# Patient Record
Sex: FEMALE | Race: WHITE | Employment: OTHER | URBAN - METROPOLITAN AREA
[De-identification: names, ages, dates, MRNs, and addresses within clinical notes are randomized per-mention and may not be internally consistent; named-entity substitution may affect disease eponyms.]

---

## 2018-02-09 ENCOUNTER — HOSPITAL ENCOUNTER (OUTPATIENT)
Dept: PHYSICAL THERAPY | Age: 68
Discharge: HOME | End: 2018-02-09
Payer: COMMERCIAL

## 2018-02-09 PROCEDURE — 97110 THERAPEUTIC EXERCISES: CPT | Performed by: PHYSICAL THERAPIST

## 2018-02-09 PROCEDURE — 97162 PT EVAL MOD COMPLEX 30 MIN: CPT | Performed by: PHYSICAL THERAPIST

## 2018-02-09 NOTE — PROGRESS NOTES
[facility] at [address] [facility]  [address]  Phone: [phone]      Fax:  [phone]    Plan of Care/ Statement of Necessity for Physical Therapy Services  Patient name: Brennan Villa Start of Care: 2018   Referral source: [facility], Not On File, MD : 1950    Medical Diagnosis: Displaced trimalleolar fracture of right lower leg, initial encounter for closed fracture [S82.851A]   Onset Date:18    Treatment Diagnosis: Pain and decreased ROM of the right ankle. Prior Hospitalization: see medical history Provider#: [id number]   Medications: Verified on Patient summary List    Comorbidities: None   Prior Level of Function: Independent, no limitations. She was the  at [employer], worked 50-55 hours per week     The Plan of Care and following information is based on the information from the initial evaluation. Assessment/ key information: Patient with signs and symptoms consistent with fracture and ORIF of the right ankle. Patient will benefit from a program of skilled physical therapy to include therapeutic exercises to address strength deficits, therapeutic activities to improve functional mobility, neuromuscular reeducation to address balance, coordination and proprioception, manual therapy to address ROM and tissue extensibility and modalities as indicated. All questions were answered. Evaluation Complexity History LOW Complexity : Zero comorbidities / personal factors that will impact the outcome / POC; Examination MEDIUM Complexity : 3 Standardized tests and measures addressing body structure, function, activity limitation and / or participation in recreation  ;Presentation MEDIUM Complexity : Evolving with changing characteristics  ; Clinical Decision Making MEDIUM Complexity : FOTO score of 26-74  Overall Complexity Rating: MEDIUM  Problem List: decrease ROM, decrease strength, edema affecting function, impaired gait/ balance, decrease ADL/ functional abilitiies, decrease activity tolerance, decrease flexibility/ joint mobility and decrease transfer abilities   Treatment Plan may include any combination of the following: Therapeutic exercise, Therapeutic activities, Neuromuscular re-education, Physical agent/modality and Manual therapy  Patient / Family readiness to learn indicated by: asking questions  Persons(s) to be included in education: patient (P)  Barriers to Learning/Limitations: None  Patient Goal (s): Get motion back in the ankle.   Patient Self Reported Health Status: good  Rehabilitation Potential: good    Short Term Goals: To be accomplished in 2 weeks:  1. Patient will be independent and competent in her HEP in order to participate in her rehabilitation for return to function. Long Term Goals: To be accomplished in 8 weeks:  1. Patient's pain level will be 0-1/10 with activity in order to improve patient's ability to perform normal ADLs. 2. Patient will demonstrate PROM 0-5 degrees dorsiflexion; 0-55 degrees plantar flexion; 0-20 degrees inversion; 0-10 degrees eversion to increase ease of ADLs. 3. Patient will increase FOTO > 16 pts to increase functional mobility. 4. Patient will be FWB and ambulate without any assistive device and be able to ascend and descend steps to increase ease of entry into home      Frequency / Duration: Patient to be seen 2-3 times per week for 8 weeks. Patient/ Caregiver education and instruction: Diagnosis, prognosis, exercises   [x]  Plan of care has been reviewed with JABIER Andrade, PT 2/9/2018 11:31 AM  _____________________________________________________________________  I certify that the above Therapy Services are being furnished while the patient is under my care. I agree with the treatment plan and certify that this therapy is necessary.     Physician's Signature:____________________  Date:__________Time:______    Please sign and return to [facility] at [facility]  [address] [facility] [facility], [address] [place] [address]  Phone: [phone]      Fax:  [phone]

## 2018-02-09 NOTE — PROGRESS NOTES
PT DAILY TREATMENT NOTE     Patient Name: Mango Elizabeth  Date:2018  : 1950  [x]  Patient  Verified  Payor: Riya Shafer / Plan: [address] [place] [address] / Product Type: PPO /    In time:9:40  Out time:10:20  Total Treatment Time (min): 40  Visit #: 1 of 24    Treatment Area: Displaced trimalleolar fracture of right lower leg, initial encounter for closed fracture [S82.283K]    SUBJECTIVE  Pain Level (0-10 scale): 1/10  Any medication changes, allergies to medications, adverse drug reactions, diagnosis change, or new procedure performed?: [x] No    [] Yes (see summary sheet for update)  Subjective functional status/changes:   [] No changes reported  See Evaluation. OBJECTIVE    20 min [x]Eval                  []Re-Eval       20 min Therapeutic Exercise:  [x] See flow sheet :   Rationale: increase ROM and increase strength to improve the patients ability to increase her functional activity level. With   [] TE   [] TA   [] neuro   [] other: Patient Education: [x] Review HEP    [] Progressed/Changed HEP based on:   [] positioning   [] body mechanics   [] transfers   [] heat/ice application    [] other:      Other Objective/Functional Measures:   See Evaluation. Pain Level (0-10 scale) post treatment: 1/10    ASSESSMENT/Changes in Function:   See Evaluation. Patient will continue to benefit from skilled PT services to modify and progress therapeutic interventions, address functional mobility deficits, address ROM deficits, address strength deficits, analyze and address soft tissue restrictions, analyze and cue movement patterns, analyze and modify body mechanics/ergonomics and assess and modify postural abnormalities to attain remaining goals. [x]  See Plan of Care  []  See progress note/recertification  []  See Discharge Summary         Progress towards goals / Updated goals:  1.  Patient's pain level will be 0-1/10 with activity in order to improve patient's ability to perform normal ADLs. Eval:  1-3/10  Current:  2. Patient will demonstrate PROM 0-5 degrees dorsiflexion; 0-55 degrees plantar flexion; 0-20 degrees inversion; 0-10 degrees eversion to increase ease of ADLs. Eval:  DF: 50-5 degrees; PF: 5-50 degrees; Inv: 0-5 degrees; Ev 5-0 degrees. Current:  3. Patient will increase FOTO > 16 pts to increase functional mobility. Eval:  48  Current:  4. Patient will be FWB and ambulate without any assistive device and be able to ascend and descend steps to increase ease of entry into home. Eval:  Patient is NWB, using a rolling walker, she is unable to do steps except on her bottom.   Current:    PLAN  [x]  Upgrade activities as tolerated     [x]  Continue plan of care  []  Update interventions per flow sheet       []  Discharge due to:_  []  Other:_      Batsheva Bustamante, PT 2/9/2018  11:42 AM    Future Appointments  Date Time Provider Naa Freitas   2/12/2018 9:30 AM [facility] [place] [facility]   2/15/2018 9:00 AM [facility] PT [facility]   2/19/2018 9:00 AM [facility] PT [place] [facility]   2/22/2018 9:30 AM [facility] PT [place] [facility]   2/26/2018 10:00 AM [facility] PT [place] [facility]   3/1/2018 9:00 AM [facility] PT [facility]

## 2018-02-12 ENCOUNTER — HOSPITAL ENCOUNTER (OUTPATIENT)
Dept: PHYSICAL THERAPY | Age: 68
Discharge: HOME | End: 2018-02-12
Payer: COMMERCIAL

## 2018-02-12 PROCEDURE — 97140 MANUAL THERAPY 1/> REGIONS: CPT | Performed by: PHYSICAL THERAPIST

## 2018-02-12 PROCEDURE — 97110 THERAPEUTIC EXERCISES: CPT | Performed by: PHYSICAL THERAPIST

## 2018-02-12 NOTE — PROGRESS NOTES
PT DAILY TREATMENT NOTE     Patient Name: Nicola Madison  Date:2018  : 1950  [x]  Patient  Verified  Payor: Mai Gross / Plan: [address] [place] [address] / Product Type: PPO /    In time:9:25  Out time:10:20  Total Treatment Time (min): 54  Visit #: 2 of 24    Treatment Area: Displaced trimalleolar fracture of right lower leg, initial encounter for closed fracture [S80.094P]    SUBJECTIVE  Pain Level (0-10 scale): 0  Any medication changes, allergies to medications, adverse drug reactions, diagnosis change, or new procedure performed?: [x] No    [] Yes (see summary sheet for update)  Subjective functional status/changes:   [] No changes reported  Patient reports that she has been doing her HEP and feels she has improved mobility. She does note she has no pain at this time. She remains on restricted WB status. OBJECTIVE    45 min Therapeutic Exercise:  [x] See flow sheet :   Rationale: increase ROM and increase strength to improve the patients ability to return to her normal level of activity. 10 min Manual Therapy:  Grade 2-3 joint mobs to talocrural and subtalar joints and midfoot and forefoot. Rationale: increase ROM and increase tissue extensibility to improve comfort and ability to return to normal function. With   [] TE   [] TA   [] neuro   [] other: Patient Education: [x] Review HEP    [] Progressed/Changed HEP based on:   [] positioning   [] body mechanics   [] transfers   [] heat/ice application    [] other:      Other Objective/Functional Measures:   Mild forefoot swelling is evident. Steristrips remain intact. No open areas or drainage noted. The ankle is not warm to the touch and no redness noted. She remains NWB with her rolling walker. Pain Level (0-10 scale) post treatment: 0/10    ASSESSMENT/Changes in Function:   Patient has improved mobility of the right ankle with no significant pain.   Patient will continue to benefit from skilled PT services to modify and progress therapeutic interventions, address functional mobility deficits, address ROM deficits, address strength deficits, analyze and address soft tissue restrictions, analyze and cue movement patterns and analyze and modify body mechanics/ergonomics to attain remaining goals. [x]  See Plan of Care  []  See progress note/recertification  []  See Discharge Summary         Progress towards goals / Updated goals:  1. Patient's pain level will be 0-1/10 with activity in order to improve patient's ability to perform normal ADLs. Eval:  1-3/10  Current:  2. Patient will demonstrate PROM 0-5 degrees dorsiflexion; 0-55 degrees plantar flexion; 0-20 degrees inversion; 0-10 degrees eversion to increase ease of ADLs. Eval:  DF: 50-5 degrees; PF: 5-50 degrees; Inv: 0-5 degrees; Ev 5-0 degrees. Current:  3. Patient will increase FOTO > 16 pts to increase functional mobility. Eval:  48  Current:  4. Patient will be FWB and ambulate without any assistive device and be able to ascend and descend steps to increase ease of entry into home. Eval:  Patient is NWB, using a rolling walker, she is unable to do steps except on her bottom.   Current:    PLAN  [x]  Upgrade activities as tolerated     [x]  Continue plan of care  []  Update interventions per flow sheet       []  Discharge due to:_  []  Other:_      Candace Pinedo, PT 2/12/2018  10:29 AM    Future Appointments  Date Time Provider Naa Freitas   2/15/2018 9:00 AM [facility] PT [place] [id number] [facility]   2/19/2018 9:00 AM [facility] PT [place] [facility]   2/22/2018 9:30 AM [facility] PT [place] [id number] [facility]   2/26/2018 10:00 AM [facility] PT [place] [id number] [facility]   3/1/2018 9:00 AM [facility]

## 2018-02-15 ENCOUNTER — APPOINTMENT (OUTPATIENT)
Dept: PHYSICAL THERAPY | Age: 68
End: 2018-02-15
Payer: COMMERCIAL

## 2018-02-16 ENCOUNTER — HOSPITAL ENCOUNTER (OUTPATIENT)
Dept: PHYSICAL THERAPY | Age: 68
Discharge: HOME | End: 2018-02-16
Payer: COMMERCIAL

## 2018-02-16 PROCEDURE — 97140 MANUAL THERAPY 1/> REGIONS: CPT | Performed by: PHYSICAL THERAPIST

## 2018-02-16 PROCEDURE — 97112 NEUROMUSCULAR REEDUCATION: CPT | Performed by: PHYSICAL THERAPIST

## 2018-02-16 PROCEDURE — 97110 THERAPEUTIC EXERCISES: CPT | Performed by: PHYSICAL THERAPIST

## 2018-02-16 NOTE — PROGRESS NOTES
PT DAILY TREATMENT NOTE     Patient Name: Britt Melgar  Date:2018  : 1950  [x]  Patient  Verified  Payor: Mayo Ring / Plan: [address] [place] [address] / Product Type: PPO /    In time:10:30  Out time:11:45  Total Treatment Time (min): 60  Visit #: 3 of 24    Treatment Area: Displaced trimalleolar fracture of right lower leg, initial encounter for closed fracture [L82.028F]    SUBJECTIVE  Pain Level (0-10 scale): 0  Any medication changes, allergies to medications, adverse drug reactions, diagnosis change, or new procedure performed?: [x] No    [] Yes (see summary sheet for update)  Subjective functional status/changes:   [] No changes reported  Patient notes she has been having no significant pain. Notes some tightness around the heel. She states her incisions have been healing great. She states she has been elevating throughout the day. OBJECTIVE    30 min Therapeutic Exercise:  [] See flow sheet :   Rationale: increase ROM and increase strength to improve the patients ability to increase her activity level. 15 min Neuromuscular Re-education:  []  See flow sheet :   Rationale: increase strength, improve coordination and increase proprioception  to improve the patients ability to increase her ADLs. 15 min Manual Therapy:  Grade 2/3 talocrural and subtalar joint mobs, heel cord stretching   Rationale: increase ROM and increase tissue extensibility to improve ease of movement to increase functional activity. With   [] TE   [] TA   [] neuro   [] other: Patient Education: [x] Review HEP    [] Progressed/Changed HEP based on:   [] positioning   [] body mechanics   [] transfers   [] heat/ice application    [] other:      Other Objective/Functional Measures:   Tight heel cord right ankle. Incisions are well healed, no open areas noted, no drainage. Mild swelling in the right forefoot.      Pain Level (0-10 scale) post treatment: 0    ASSESSMENT/Changes in Function: Patient with improved AROM and less swelling in the right foot and ankle. Patient will continue to benefit from skilled PT services to modify and progress therapeutic interventions, address functional mobility deficits, address ROM deficits, address strength deficits and analyze and address soft tissue restrictions to attain remaining goals. [x]  See Plan of Care  []  See progress note/recertification  []  See Discharge Summary         Progress towards goals / Updated goals:  1. Patient's pain level will be 0-1/10 with activity in order to improve patient's ability to perform normal ADLs. Eval:  1-3/10  Current: 0-1/10.  2/16/18  2. Patient will demonstrate PROM 0-5 degrees dorsiflexion; 0-55 degrees plantar flexion; 0-20 degrees inversion; 0-10 degrees eversion to increase ease of ADLs. Eval:  DF: 50-5 degrees; PF: 5-50 degrees; Inv: 0-5 degrees; Ev 5-0 degrees. Current:  3. Patient will increase FOTO > 16 pts to increase functional mobility. Eval:  18  Current:  4. Patient will be FWB and ambulate without any assistive device and be able to ascend and descend steps to increase ease of entry into home. Eval:  Patient is NWB, using a rolling walker, she is unable to do steps except on her bottom.   Current:  Remains NWB    PLAN  [x]  Upgrade activities as tolerated     [x]  Continue plan of care  []  Update interventions per flow sheet       []  Discharge due to:_  []  Other:_      Mikael العراقي, PT 2/16/2018  11:51 AM    Future Appointments  Date Time Provider Naa Freitas   2/19/2018 9:00 AM [facility] PT [place] [facility]   2/22/2018 9:30 AM [facility] PT [place] [facility]   2/26/2018 10:00 AM [facility] PT [place] [facility]   3/1/2018 9:00 AM [facility] PT [place] [facility]

## 2018-02-19 ENCOUNTER — HOSPITAL ENCOUNTER (OUTPATIENT)
Dept: PHYSICAL THERAPY | Age: 68
Discharge: HOME | End: 2018-02-19
Payer: COMMERCIAL

## 2018-02-19 PROCEDURE — 97110 THERAPEUTIC EXERCISES: CPT | Performed by: PHYSICAL THERAPIST

## 2018-02-19 PROCEDURE — 97140 MANUAL THERAPY 1/> REGIONS: CPT | Performed by: PHYSICAL THERAPIST

## 2018-02-19 NOTE — PROGRESS NOTES
PT DAILY TREATMENT NOTE     Patient Name: Josesito Epstein  Date:2018  : 1950  [x]  Patient  Verified  Payor: Ryan Alcocer / Plan: [address] [place] [address] / Product Type: PPO /    In time:9:00  Out time:9:55  Total Treatment Time (min): 55  Visit #: 4 of 24    Treatment Area: Displaced trimalleolar fracture of right lower leg, initial encounter for closed fracture [W43.052O]    SUBJECTIVE  Pain Level (0-10 scale): 0  Any medication changes, allergies to medications, adverse drug reactions, diagnosis change, or new procedure performed?: [x] No    [] Yes (see summary sheet for update)  Subjective functional status/changes:   [] No changes reported  Patient reports that she has intermittent pain in the right foot/ankle. She usually notices the pain at night. Pain level at worst 1/10; at best 0/10. OBJECTIVE  45 min Therapeutic Exercise:  [] See flow sheet :   Rationale: increase ROM and increase strength to improve the patients ability to be able to increase her functional activity level. 10 min Manual Therapy:  Grade 2/3 subtalar and talocrural and midfoot joint mobs   Rationale: increase ROM and increase tissue extensibility to improve ease of motion to regain functional mobility. With   [] TE   [] TA   [] neuro   [] other: Patient Education: [x] Review HEP    [] Progressed/Changed HEP based on:   [] positioning   [] body mechanics   [] transfers   [] heat/ice application    [] other:      Other Objective/Functional Measures:   PROM:  DF: 0-4; PF: 0-50; INV: 0-20; EV: 0-10. She remains NWB in the boot with her rolling walker. Mild swelling noted in the right foot and ankle. Pain Level (0-10 scale) post treatment: 0    ASSESSMENT/Changes in Function:   Patient has increased ROM in the right ankle. She continues with only minimal pain and she remains NWB with her walker.   Patient will continue to benefit from skilled PT services to modify and progress therapeutic interventions, address functional mobility deficits, address ROM deficits, address strength deficits, analyze and address soft tissue restrictions, analyze and cue movement patterns and analyze and modify body mechanics/ergonomics to attain remaining goals. [x]  See Plan of Care  []  See progress note/recertification  []  See Discharge Summary         Progress towards goals / Updated goals:  1. Patient's pain level will be 0-1/10 with activity in order to improve patient's ability to perform normal ADLs. Eval:  1-3/10  Current: 0-1/10.  2/16/18  2. Patient will demonstrate PROM 0-5 degrees dorsiflexion; 0-55 degrees plantar flexion; 0-20 degrees inversion; 0-10 degrees eversion to increase ease of ADLs. Eval:  DF: 50-5 degrees; PF: 5-50 degrees; Inv: 0-5 degrees; Ev 5-0 degrees. Current: DF: 0-4; PF: 0-50; INV: 0-20; EV: 0-10.  2/19/18  3. Patient will increase FOTO > 16 pts to increase functional mobility. Eval:  57  Current:  4. Patient will be FWB and ambulate without any assistive device and be able to ascend and descend steps to increase ease of entry into home. Eval:  Patient is NWB, using a rolling walker, she is unable to do steps except on her bottom.   Current:  Remains NWB    PLAN  [x]  Upgrade activities as tolerated     [x]  Continue plan of care  []  Update interventions per flow sheet       []  Discharge due to:_  []  Other:_      Candace Pinedo, PT 2/19/2018  9:05 AM    Future Appointments  Date Time Provider Naa Freitas   2/22/2018 9:30 AM [facility] PT [address] [facility]   2/26/2018 10:00 AM [facility] PT [place] [facility]   3/1/2018 9:00 AM [facility] PT [facility]

## 2018-02-22 ENCOUNTER — HOSPITAL ENCOUNTER (OUTPATIENT)
Dept: PHYSICAL THERAPY | Age: 68
Discharge: HOME | End: 2018-02-22
Payer: COMMERCIAL

## 2018-02-22 PROCEDURE — 97110 THERAPEUTIC EXERCISES: CPT | Performed by: PHYSICAL THERAPIST

## 2018-02-22 PROCEDURE — 97140 MANUAL THERAPY 1/> REGIONS: CPT | Performed by: PHYSICAL THERAPIST

## 2018-02-22 PROCEDURE — 97530 THERAPEUTIC ACTIVITIES: CPT | Performed by: PHYSICAL THERAPIST

## 2018-02-22 PROCEDURE — 97112 NEUROMUSCULAR REEDUCATION: CPT | Performed by: PHYSICAL THERAPIST

## 2018-02-22 NOTE — PROGRESS NOTES
PT DAILY TREATMENT NOTE     Patient Name: Consuelo Krishna  Date:2018  : 1950  [x]  Patient  Verified  Payor: Trent Floyd / Plan: [address] [place] [address] / Product Type: PPO /    In time:9:30  Out time:11:10  Total Treatment Time (min): 90  Visit #: 5 of 24    Treatment Area: Displaced trimalleolar fracture of right lower leg, initial encounter for closed fracture [B93.203O]    SUBJECTIVE  Pain Level (0-10 scale): 0  Any medication changes, allergies to medications, adverse drug reactions, diagnosis change, or new procedure performed?: [x] No    [] Yes (see summary sheet for update)  Subjective functional status/changes:   [] No changes reported  Patient reports that she is doing much better. She has only occasional twinges of pain in the lateral ankle. She notes greater mobility in the ankle. OBJECTIVE    30 min Therapeutic Exercise:  [] See flow sheet :   Rationale: increase ROM and increase strength to improve the patients ability to perform her normal ADLs. 15 min Therapeutic Activity:  []  See flow sheet :   Rationale: improve coordination and increase proprioception  to improve the patients ability to increase her normal ADLs. 30 min Neuromuscular Re-education:  []  See flow sheet :   Rationale: increase strength, improve coordination and increase proprioception  to improve the patients ability to improve her strength in order to work toward increased function. 15 min Manual Therapy:  Grade 3/4 subtalar and talocrural joint mobs and ankle stretching. Rationale: increase ROM and increase tissue extensibility to improve mobility for increased function. With   [] TE   [] TA   [] neuro   [] other: Patient Education: [x] Review HEP    [] Progressed/Changed HEP based on:   [] positioning   [] body mechanics   [] transfers   [] heat/ice application    [] other:      Other Objective/Functional Measures:   Patient remains NWB on the right with a rolling walker.   She continues using the boot. She has mild swellling in the right foot. Pain Level (0-10 scale) post treatment: 0    ASSESSMENT/Changes in Function:   Patient continues to progress her ROM well. Function remains limited at she is still NWB. Patient will continue to benefit from skilled PT services to modify and progress therapeutic interventions, address functional mobility deficits, address ROM deficits, address strength deficits, analyze and address soft tissue restrictions, analyze and cue movement patterns and analyze and modify body mechanics/ergonomics to attain remaining goals. []  See Plan of Care  []  See progress note/recertification  []  See Discharge Summary         Progress towards goals / Updated goals:  1. Patient's pain level will be 0-1/10 with activity in order to improve patient's ability to perform normal ADLs. Eval:  1-3/10  Current: 0-1/10.  2/16/18  2. Patient will demonstrate PROM 0-5 degrees dorsiflexion; 0-55 degrees plantar flexion; 0-20 degrees inversion; 0-10 degrees eversion to increase ease of ADLs. Eval:  DF: 50-5 degrees; PF: 5-50 degrees; Inv: 0-5 degrees; Ev 5-0 degrees. Current: DF: 0-4; PF: 0-50; INV: 0-20; EV: 0-10.  2/19/18  3. Patient will increase FOTO > 16 pts to increase functional mobility. Eval:  17  Current:  4. Patient will be FWB and ambulate without any assistive device and be able to ascend and descend steps to increase ease of entry into home. Eval:  Patient is NWB, using a rolling walker, she is unable to do steps except on her bottom.   Current:  Remains NWB    PLAN  []  Upgrade activities as tolerated     []  Continue plan of care  []  Update interventions per flow sheet       []  Discharge due to:_  []  Other:_      Fady Decker PT 2/22/2018  12:24 PM    Future Appointments  Date Time Provider Naa Freitas   2/26/2018 10:00 AM [facility] [place] [facility]   3/1/2018 9:00 AM [facility] [place] [facility]   3/5/2018 10:00 AM Tricia Salmon Frankie Lugo [facility]   3/8/2018 9:30 AM Caterina Howe, CHAO [facility]   3/12/2018 9:30 AM Caterina Howe PT [facility]   3/15/2018 9:30 AM [facility] [place]

## 2018-02-26 ENCOUNTER — HOSPITAL ENCOUNTER (OUTPATIENT)
Dept: PHYSICAL THERAPY | Age: 68
Discharge: HOME | End: 2018-02-26
Payer: COMMERCIAL

## 2018-02-26 PROCEDURE — 97140 MANUAL THERAPY 1/> REGIONS: CPT | Performed by: PHYSICAL THERAPIST

## 2018-02-26 PROCEDURE — 97110 THERAPEUTIC EXERCISES: CPT | Performed by: PHYSICAL THERAPIST

## 2018-02-26 PROCEDURE — 97530 THERAPEUTIC ACTIVITIES: CPT | Performed by: PHYSICAL THERAPIST

## 2018-02-26 PROCEDURE — 97112 NEUROMUSCULAR REEDUCATION: CPT | Performed by: PHYSICAL THERAPIST

## 2018-02-26 NOTE — PROGRESS NOTES
PT DAILY TREATMENT NOTE     Patient Name: Bello Winston  Date:2018  : 1950  [x]  Patient  Verified  Payor: Emre Cain / Plan: [address] [place] [address] / Product Type: PPO /    In time:10:30  Out time:11:35  Total Treatment Time (min): 65  Visit #: 6 of 24    Treatment Area: Displaced trimalleolar fracture of right lower leg, initial encounter for closed fracture [P32.796J]    SUBJECTIVE  Pain Level (0-10 scale): 0/10  Any medication changes, allergies to medications, adverse drug reactions, diagnosis change, or new procedure performed?: [x] No    [] Yes (see summary sheet for update)  Subjective functional status/changes:   [] No changes reported  Patient notes intermittent pain, \"nerve pain\", at times but overall she has no pain. She does note that some scabbing is dropping off and she may have a stitch in one area. OBJECTIVE    20 min Therapeutic Exercise:  [x] See flow sheet :   Rationale: increase ROM and increase strength to improve the patients ability to increase her functional activity level. 15 min Therapeutic Activity:  [x]  See flow sheet :   Rationale: increase strength, improve coordination and increase proprioception  to improve the patients ability to increase her mobility and ADLs. 15 min Neuromuscular Re-education:  [x]  See flow sheet :   Rationale: increase strength, improve coordination and increase proprioception  to improve the patients ability to increase her function. 15 min Manual Therapy:  Subtalar and talocrural grade 3 mobilizations, manual stretching. Rationale: increase ROM and increase tissue extensibility to improve ease of movement to increase function. With   [] TE   [] TA   [] neuro   [] other: Patient Education: [x] Review HEP    [] Progressed/Changed HEP based on:   [] positioning   [] body mechanics   [] transfers   [] heat/ice application    [] other:      Other Objective/Functional Measures:    There is a stitch over the lateral malleolus evident. She has mild tenderness to palpation and no significant swelling or redness over that area. No drainage noted. She has mild swelling in her right ankle/foot. Pain Level (0-10 scale) post treatment: 0    ASSESSMENT/Changes in Function:   Patient has decreased pain and swelling with improved PROM/AROM. She remains NWB. Patient will continue to benefit from skilled PT services to modify and progress therapeutic interventions, address functional mobility deficits, address ROM deficits, address strength deficits, analyze and address soft tissue restrictions and analyze and cue movement patterns to attain remaining goals. [x]  See Plan of Care  []  See progress note/recertification  []  See Discharge Summary         Progress towards goals / Updated goals:  1. Patient's pain level will be 0-1/10 with activity in order to improve patient's ability to perform normal ADLs. Eval:  1-3/10  Current: 0-1/10.  2/16/18  2. Patient will demonstrate PROM 0-5 degrees dorsiflexion; 0-55 degrees plantar flexion; 0-20 degrees inversion; 0-10 degrees eversion to increase ease of ADLs. Eval:  DF: 50-5 degrees; PF: 5-50 degrees; Inv: 0-5 degrees; Ev 5-0 degrees. Current: DF: 0-4; PF: 0-50; INV: 0-20; EV: 0-10.  2/19/18  3. Patient will increase FOTO > 16 pts to increase functional mobility. Eval:  10  Current:  4. Patient will be FWB and ambulate without any assistive device and be able to ascend and descend steps to increase ease of entry into home. Eval:  Patient is NWB, using a rolling walker, she is unable to do steps except on her bottom.   Current:  Remains NWB       PLAN  []  Upgrade activities as tolerated     []  Continue plan of care  []  Update interventions per flow sheet       []  Discharge due to:_  []  Other:_      Margarita Leone, PT 2/26/2018  10:35 AM    Future Appointments  Date Time Provider Naa Freitas   3/1/2018 9:00 [address] [facility]   3/5/2018 10:00 AM Batsheva Bustamante, PT [facility]   3/8/2018 9:30 AM Batsheva Bustamante, PT [facility]   3/12/2018 9:30 AM Batsheva Bustamante, PT [facility]   3/15/2018 9:30 AM Batsheva Bustamante, [facility] [place]

## 2018-03-01 ENCOUNTER — HOSPITAL ENCOUNTER (OUTPATIENT)
Dept: PHYSICAL THERAPY | Age: 68
Discharge: HOME | End: 2018-03-01
Payer: COMMERCIAL

## 2018-03-01 PROCEDURE — 97140 MANUAL THERAPY 1/> REGIONS: CPT | Performed by: PHYSICAL THERAPIST

## 2018-03-01 PROCEDURE — 97110 THERAPEUTIC EXERCISES: CPT | Performed by: PHYSICAL THERAPIST

## 2018-03-01 NOTE — PROGRESS NOTES
PT DAILY TREATMENT NOTE     Patient Name: Yesica Michelle  Date:3/1/2018  : 1950  [x]  Patient  Verified  Payor: Eddi Ovalles / Plan: [address] [place] [address] / Product Type: PPO /    In time:8:55  Out time:10:00  Total Treatment Time (min): 65  Visit #: 7 of 24    Treatment Area: Displaced trimalleolar fracture of right lower leg, initial encounter for closed fracture [S82.832K]    SUBJECTIVE  Pain Level (0-10 scale): 0  Any medication changes, allergies to medications, adverse drug reactions, diagnosis change, or new procedure performed?: [x] No    [] Yes (see summary sheet for update)  Subjective functional status/changes:   [] No changes reported  Patient states she saw the PA at her doctor's office who removed the stitch she found earlier this week. She notes there is less tenderness over the medial ankle now. OBJECTIVE    50 min Therapeutic Exercise:  [x] See flow sheet :   Rationale: increase ROM and increase strength to improve the patients ability to increase her functional mobility. 12 min Manual Therapy:  Grade 3/4 subtalar and talocrural joint mobs, manual stretching    Rationale: increase ROM and increase tissue extensibility to improved mobility for ease of motion to do her TE effectively for improved function. With   [] TE   [] TA   [] neuro   [] other: Patient Education: [x] Review HEP    [] Progressed/Changed HEP based on:   [] positioning   [] body mechanics   [] transfers   [] heat/ice application    [] other:      Other Objective/Functional Measures:   Medial ankle is pink but not red or significantly tender. There is no drainage noted at the ankle, site of the stitch removal.  She continues to ambulate NWB with boot and rolling walker. Pain Level (0-10 scale) post treatment: 0    ASSESSMENT/Changes in Function:   Patient has decreased pain and improved ROM of the right ankle.   Patient will continue to benefit from skilled PT services to modify and progress therapeutic interventions, address functional mobility deficits, address ROM deficits, address strength deficits, analyze and address soft tissue restrictions, analyze and cue movement patterns and analyze and modify body mechanics/ergonomics to attain remaining goals. [x]  See Plan of Care  []  See progress note/recertification  []  See Discharge Summary         Progress towards goals / Updated goals:  1. Patient's pain level will be 0-1/10 with activity in order to improve patient's ability to perform normal ADLs. Eval:  1-3/10  Current: 0-1/10.  2/16/18  2. Patient will demonstrate PROM 0-5 degrees dorsiflexion; 0-55 degrees plantar flexion; 0-20 degrees inversion; 0-10 degrees eversion to increase ease of ADLs. Eval:  DF: 50-5 degrees; PF: 5-50 degrees; Inv: 0-5 degrees; Ev 5-0 degrees. Current: DF: 0-4; PF: 0-50; INV: 0-20; EV: 0-10.  2/19/18  3. Patient will increase FOTO > 16 pts to increase functional mobility. Eval:  03  Current:  4. Patient will be FWB and ambulate without any assistive device and be able to ascend and descend steps to increase ease of entry into home. Eval:  Patient is NWB, using a rolling walker, she is unable to do steps except on her bottom.   Current:  Remains NWB    PLAN  [x]  Upgrade activities as tolerated     [x]  Continue plan of care  []  Update interventions per flow sheet       []  Discharge due to:_  []  Other:_      Liliana Andrade, PT 3/1/2018  9:53 AM    Future Appointments  Date Time Provider Naa Freitas   3/5/2018 10:00 AM Liliana Andrade, PT [facility]   3/8/2018 9:30 AM Liliana Andrade, PT [facility]   3/12/2018 9:30 AM Liliana Andrade, PT [facility]   3/15/2018 9:30 AM Liliana Andrade, [facility] [place]

## 2018-03-05 ENCOUNTER — HOSPITAL ENCOUNTER (OUTPATIENT)
Dept: PHYSICAL THERAPY | Age: 68
Discharge: HOME | End: 2018-03-05
Payer: COMMERCIAL

## 2018-03-05 PROCEDURE — 97110 THERAPEUTIC EXERCISES: CPT | Performed by: PHYSICAL THERAPIST

## 2018-03-05 PROCEDURE — 97140 MANUAL THERAPY 1/> REGIONS: CPT | Performed by: PHYSICAL THERAPIST

## 2018-03-05 NOTE — PROGRESS NOTES
PT DAILY TREATMENT NOTE     Patient Name: Michaeline Goodpasture  Date:3/5/2018  : 1950  [x]  Patient  Verified  Payor: Barbara Carpenter / Plan: [address] [place] [address] / Product Type: PPO /    In time:9:50  Out time:10:55  Total Treatment Time (min): 60  Visit #: 8 of 24    Treatment Area: Displaced trimalleolar fracture of right lower leg, initial encounter for closed fracture [S82.820U]    SUBJECTIVE  Pain Level (0-10 scale): 0  Any medication changes, allergies to medications, adverse drug reactions, diagnosis change, or new procedure performed?: [x] No    [] Yes (see summary sheet for update)  Subjective functional status/changes:   [] No changes reported  Patient reports that she was able to sleep without the boot last night. She continues to report she does not have any pain, but describes her sensation as tightness. OBJECTIVE    50 min Therapeutic Exercise:  [] See flow sheet :   Rationale: increase ROM and increase strength to improve the patients ability to improve her functional activity level. 10 min Manual Therapy:  Grade 3/4 subtalar and talocrural joint mobs, manual stretching. Rationale: increase ROM and increase tissue extensibility to improve mobility for ease of motion to increase function. With   [] TE   [] TA   [] neuro   [] other: Patient Education: [x] Review HEP    [] Progressed/Changed HEP based on:   [] positioning   [] body mechanics   [] transfers   [] heat/ice application    [] other:      Other Objective/Functional Measures:   Patient continues use of her boot with NWB on the right foot. She has mild swelling in the ankle and foot. Pain Level (0-10 scale) post treatment: 0    ASSESSMENT/Changes in Function:   Patient has improved mobility and less pain.   Patient will continue to benefit from skilled PT services to modify and progress therapeutic interventions, address functional mobility deficits, address ROM deficits, address strength deficits, analyze and address soft tissue restrictions, analyze and cue movement patterns and analyze and modify body mechanics/ergonomics to attain remaining goals. [x]  See Plan of Care  []  See progress note/recertification  []  See Discharge Summary         Progress towards goals / Updated goals:  1. Patient's pain level will be 0-1/10 with activity in order to improve patient's ability to perform normal ADLs. Eval:  1-3/10  Current: 0-1/10.  2/16/18  2. Patient will demonstrate PROM 0-5 degrees dorsiflexion; 0-55 degrees plantar flexion; 0-20 degrees inversion; 0-10 degrees eversion to increase ease of ADLs. Eval:  DF: 50-5 degrees; PF: 5-50 degrees; Inv: 0-5 degrees; Ev 5-0 degrees. Current: DF: 0-4; PF: 0-50; INV: 0-20; EV: 0-10.  2/19/18  3. Patient will increase FOTO > 16 pts to increase functional mobility. Eval:  81  Current:  4. Patient will be FWB and ambulate without any assistive device and be able to ascend and descend steps to increase ease of entry into home. Eval:  Patient is NWB, using a rolling walker, she is unable to do steps except on her bottom.   Current:  Remains NWB    PLAN  [x]  Upgrade activities as tolerated     [x]  Continue plan of care  []  Update interventions per flow sheet       []  Discharge due to:_  []  Other:_      Emilio Bailey, PT 3/5/2018  10:18 AM    Future Appointments  Date Time Provider Naa Freitas   3/8/2018 9:30 AM Emilio Bailey, PT [facility]   3/12/2018 9:30 AM Emilio Bailey, PT [facility]   3/15/2018 9:30 AM Emilio Bailey, PT Kathryn Macedo

## 2018-03-08 ENCOUNTER — HOSPITAL ENCOUNTER (OUTPATIENT)
Dept: PHYSICAL THERAPY | Age: 68
Discharge: HOME | End: 2018-03-08
Payer: COMMERCIAL

## 2018-03-08 PROCEDURE — 97140 MANUAL THERAPY 1/> REGIONS: CPT | Performed by: PHYSICAL THERAPIST

## 2018-03-08 PROCEDURE — 97110 THERAPEUTIC EXERCISES: CPT | Performed by: PHYSICAL THERAPIST

## 2018-03-08 NOTE — PROGRESS NOTES
PT DAILY TREATMENT NOTE     Patient Name: Britt Melgar  Date:3/8/2018  : 1950  [x]  Patient  Verified  Payor: Mayo Joselinmichael / Plan: [address] [place] [address] / Product Type: PPO /    In time:1:00  Out time:2:00  Total Treatment Time (min): 60  Visit #: 9 of 24    Treatment Area: Displaced trimalleolar fracture of right lower leg, initial encounter for closed fracture [S82.430H]    SUBJECTIVE  Pain Level (0-10 scale): 0/10  Any medication changes, allergies to medications, adverse drug reactions, diagnosis change, or new procedure performed?: [x] No    [] Yes (see summary sheet for update)  Subjective functional status/changes:   [] No changes reported  Patient reports just intermittent and occasional pain over the medial aspect of her right ankle. OBJECTIVE    50 min Therapeutic Exercise:  [] See flow sheet :   Rationale: increase ROM and increase strength to improve the patients ability to increase her functional activity level. 10 min Manual Therapy:  Subtalar and Talocrural joint mobs, grade 3, supine   Rationale: increase ROM and increase tissue extensibility to improve ease of motion to increase function. With   [] TE   [] TA   [] neuro   [] other: Patient Education: [x] Review HEP    [] Progressed/Changed HEP based on:   [] positioning   [] body mechanics   [] transfers   [] heat/ice application    [] other:      Other Objective/Functional Measures:   Patient rmains NWB with a rolling walker. She has mild swelling in the right ankle and foot. MMT: Right ankle DF/PF, Inv/Ev 5/5     Pain Level (0-10 scale) post treatment: 0/10    ASSESSMENT/Changes in Function:   Patinet with improved ROM and less swelling. She has good ankle strength.   Patient will continue to benefit from skilled PT services to modify and progress therapeutic interventions, address functional mobility deficits, address ROM deficits, address strength deficits, analyze and address soft tissue restrictions and analyze and cue movement patterns to attain remaining goals. [x]  See Plan of Care  []  See progress note/recertification  []  See Discharge Summary         Progress towards goals / Updated goals:  1. Patient's pain level will be 0-1/10 with activity in order to improve patient's ability to perform normal ADLs. Eval:  1-3/10  Current: 0-1/10.  3/8/18  2. Patient will demonstrate PROM 0-5 degrees dorsiflexion; 0-55 degrees plantar flexion; 0-20 degrees inversion; 0-10 degrees eversion to increase ease of ADLs. Eval:  DF: 50-5 degrees; PF: 5-50 degrees; Inv: 0-5 degrees; Ev 5-0 degrees. Current: DF: 0-4; PF: 0-50; INV: 0-20; EV: 0-10.  2/19/18  3. Patient will increase FOTO > 16 pts to increase functional mobility. Eval:  23  Current:  4. Patient will be FWB and ambulate without any assistive device and be able to ascend and descend steps to increase ease of entry into home. Eval:  Patient is NWB, using a rolling walker, she is unable to do steps except on her bottom.   Current:  Remains NWB    PLAN  [x]  Upgrade activities as tolerated     [x]  Continue plan of care  []  Update interventions per flow sheet       []  Discharge due to:_  []  Other:_      Michelle Archer PT 3/8/2018  1:30 PM    Future Appointments  Date Time Provider Naa Freitas   3/12/2018 9:30 AM Michelle Archer, PT [facility]   3/15/2018 9:30 AM Cory Hush

## 2018-03-12 ENCOUNTER — HOSPITAL ENCOUNTER (OUTPATIENT)
Dept: PHYSICAL THERAPY | Age: 68
Discharge: HOME | End: 2018-03-12
Payer: COMMERCIAL

## 2018-03-12 PROCEDURE — 97110 THERAPEUTIC EXERCISES: CPT | Performed by: PHYSICAL THERAPIST

## 2018-03-12 PROCEDURE — 97140 MANUAL THERAPY 1/> REGIONS: CPT | Performed by: PHYSICAL THERAPIST

## 2018-03-12 NOTE — PROGRESS NOTES
PT DAILY TREATMENT NOTE     Patient Name: Hyacinth Briceño  Date:3/12/2018  : 1950  [x]  Patient  Verified  Payor: Tamiko Charles / Plan: [address] [place] [address] / Product Type: PPO /    In time:1:00  Out time:2:10  Total Treatment Time (min): 70  Visit #: 10 of 24    Treatment Area: Displaced trimalleolar fracture of right lower leg, initial encounter for closed fracture [R34.060Q]    SUBJECTIVE  Pain Level (0-10 scale): 0/10  Any medication changes, allergies to medications, adverse drug reactions, diagnosis change, or new procedure performed?: [x] No    [] Yes (see summary sheet for update)  Subjective functional status/changes:   [] No changes reported  Pateint notes that she has less swelling and denies any significant pain. Occasional sharp pain medial ankle. She also noted occasional redness over the anterior ankle where there is a possible screw head palpable. OBJECTIVE    50 min Therapeutic Exercise:  [] See flow sheet :   Rationale: increase ROM and increase strength to improve the patients ability to increase her functional activity level. 15 min Manual Therapy:  Grade 3/4 subtalar and talocrural joint mobs   Rationale: increase ROM and increase tissue extensibility to improve ease of motion to increase function. With   [] TE   [] TA   [] neuro   [] other: Patient Education: [x] Review HEP    [] Progressed/Changed HEP based on:   [] positioning   [] body mechanics   [] transfers   [] heat/ice application    [] other:      Other Objective/Functional Measures:   Palpation over the anterior ankle at the site of a small incision a possible screw head is noted. No redness or open areas noted. She remains NWB with her rolling walker. Pain Level (0-10 scale) post treatment: 0/10    ASSESSMENT/Changes in Function:   Patient has good ROM and minimal pain.     Patient will continue to benefit from skilled PT services to modify and progress therapeutic interventions, address functional mobility deficits, address ROM deficits, address strength deficits and analyze and address soft tissue restrictions to attain remaining goals. [x]  See Plan of Care  []  See progress note/recertification  []  See Discharge Summary         Progress towards goals / Updated goals:  1. Patient's pain level will be 0-1/10 with activity in order to improve patient's ability to perform normal ADLs. Eval:  1-3/10  Current: 0-1/10.  3/8/18  2. Patient will demonstrate PROM 0-5 degrees dorsiflexion; 0-55 degrees plantar flexion; 0-20 degrees inversion; 0-10 degrees eversion to increase ease of ADLs. Eval:  DF: 50-5 degrees; PF: 5-50 degrees; Inv: 0-5 degrees; Ev 5-0 degrees. Current: DF: 0-4; PF: 0-50; INV: 0-20; EV: 0-10.  2/19/18  3. Patient will increase FOTO > 16 pts to increase functional mobility. Eval:  15  Current:  77.  3/12/18  4. Patient will be FWB and ambulate without any assistive device and be able to ascend and descend steps to increase ease of entry into home. Eval:  Patient is NWB, using a rolling walker, she is unable to do steps except on her bottom.   Current:  Remains NWB    PLAN  [x]  Upgrade activities as tolerated     []  Continue plan of care  []  Update interventions per flow sheet       []  Discharge due to:_  []  Other:_      Brooke Bloom, PT 3/12/2018  1:02 PM    Future Appointments  Date Time Provider Naa Freitas   3/15/2018 3:00 PM Brooke Bloom, PT [facility]

## 2018-03-15 ENCOUNTER — HOSPITAL ENCOUNTER (OUTPATIENT)
Dept: PHYSICAL THERAPY | Age: 68
Discharge: HOME | End: 2018-03-15
Payer: COMMERCIAL

## 2018-03-15 PROCEDURE — 97140 MANUAL THERAPY 1/> REGIONS: CPT | Performed by: PHYSICAL THERAPIST

## 2018-03-15 PROCEDURE — 97110 THERAPEUTIC EXERCISES: CPT | Performed by: PHYSICAL THERAPIST

## 2018-03-15 NOTE — PROGRESS NOTES
[facility] at [address] [facility]  [address]  Phone: [phone]      Fax:  [phone]    Progress Note  Patient name: Andi Callahan Start of Care: 18   Referral source: Enrique Mckeon PA-C : 1950   Medical/Treatment Diagnosis: Displaced trimalleolar fracture of right lower leg, initial encounter for closed fracture [S82.851A] Onset Date:18     Prior Hospitalization: see medical history Provider#: [id number]   Medications: Verified on Patient Summary List    Comorbidities: None   Prior Level of Function:  Independent, no limitations. She was the  at [employer], worked 50-55 hours per week    Visits from Start of Care: 11    Missed Visits: 0    Established Goals:          Excellent Good         Limited           None  [] Increased ROM   []  [x]  []  []  [] Increased Strength  []  [x]  []  []  [] Increased Mobility  []  []  []  []   [] Decreased Pain   [x]  []  []  []  [] Decreased Swelling  []  [x]  []  []    Key Functional Changes: Patient has improved ROM, and strength. She has no pain and minimal swelling. Progress towards goals / Updated goals:  1. Patient's pain level will be 0-1/10 with activity in order to improve patient's ability to perform normal ADLs. Eval:  1-3/10  Current: 0-110.  3/8/18  2. Patient will demonstrate PROM 0-5 degrees dorsiflexion; 0-55 degrees plantar flexion; 0-20 degrees inversion; 0-10 degrees eversion to increase ease of ADLs. Eval:  DF: 50-5 degrees; PF: 5-50 degrees; Inv: 0-5 degrees; Ev 5-0 degrees. Current: DF: 0-8; PF: 0-50; INV: 0-20; EV: 0-12.  3/15/18  3. Patient will increase FOTO > 16 pts to increase functional mobility. Eval:  81  Current:  93.  3  4. Patient will be FWB and ambulate without any assistive device and be able to ascend and descend steps to increase ease of entry into home.   Eval:  Patient is NWB, using a rolling walker, she is unable to do steps except on her bottom. Current:  Remains NWB     ASSESSMENT/RECOMMENDATIONS:  []Continue therapy per initial plan/protocol at a frequency of   x per week for  weeks  [x]Continue therapy with the following recommended changes: per protocol. []Discontinue therapy progressing towards or have reached established goals  []Discontinue therapy due to lack of appreciable progress towards goals  []Discontinue therapy due to lack of attendance or compliance  []Await Physician's recommendations/decisions regarding therapy  []Other:________________________________________________________________    Thank you for this referral.   Kathi Archibald, PT 3/15/2018 2:26 PM  NOTE TO PHYSICIAN:  [address]   FAX TO [facility]: [phone]  If you are unable to process this request in 24 hours please contact our office:   [phone]  []  I have read the above report and request that my patient continue as recommended. []  I have read the above report and request that my patient continue therapy with the following changes/special instructions:________________________________________  []I have read the above report and request that my patient be discharged from therapy.     Physicians signature: ______________________________Date: ______Time:______

## 2018-03-15 NOTE — PROGRESS NOTES
PT DAILY TREATMENT NOTE     Patient Name: America Sanderson  Date:3/15/2018  : 1950  [x]  Patient  Verified  Payor: Rocio Collado / Plan: [address] [place] [address] / Product Type: PPO /    In time:1:30  Out time:2:25  Total Treatment Time (min): 55  Visit #: 11 of 24    Treatment Area: Displaced trimalleolar fracture of right lower leg, initial encounter for closed fracture [S86.591G]    SUBJECTIVE  Pain Level (0-10 scale): 0/10  Any medication changes, allergies to medications, adverse drug reactions, diagnosis change, or new procedure performed?: [x] No    [] Yes (see summary sheet for update)  Subjective functional status/changes:   [] No changes reported  Patient reports she has not been having pain. She states that she has been compliant with her HEP. OBJECTIVE    45 min Therapeutic Exercise:  [x] See flow sheet :   Rationale: increase ROM and increase strength to improve the patients ability to increase patient's functional activity level. 10 min Manual Therapy:  Subtalar and talocrural joint mobs, grade 3/4, supine   Rationale: increase ROM and increase tissue extensibility to increase ease of motion to improve function. With   [] TE   [] TA   [] neuro   [] other: Patient Education: [x] Review HEP    [] Progressed/Changed HEP based on:   [] positioning   [] body mechanics   [] transfers   [] heat/ice application    [] other:      Other Objective/Functional Measures:   PROM:  DF 0-8 degrees; PF  0-50 degrees; Inversion  0-20 degrees; Eversion  0-12 degrees. Ambulates with a rolling walker, NWB on Right. Strength:  DF/PF 5/5, Inversion/Eversion 4/5. Palpation:  No c/o tenderness to palpation of the right ankle. Pain Level (0-10 scale) post treatment: 0/10    ASSESSMENT/Changes in Function:   Patient has improved ROM and has minimal to no pain. She has good strength on MMT and no tenderness upon palpation of the right ankle.     Patient will continue to benefit from skilled PT services to modify and progress therapeutic interventions, address functional mobility deficits, address ROM deficits, address strength deficits, analyze and address soft tissue restrictions, analyze and cue movement patterns and analyze and modify body mechanics/ergonomics to attain remaining goals. [x]  See Plan of Care  []  See progress note/recertification  []  See Discharge Summary         Progress towards goals / Updated goals:  1. Patient's pain level will be 0-1/10 with activity in order to improve patient's ability to perform normal ADLs. Eval:  1-3/10  Current: 0-1/10.  3/8/18  2. Patient will demonstrate PROM 0-5 degrees dorsiflexion; 0-55 degrees plantar flexion; 0-20 degrees inversion; 0-10 degrees eversion to increase ease of ADLs. Eval:  DF: 50-5 degrees; PF: 5-50 degrees; Inv: 0-5 degrees; Ev 5-0 degrees. Current: DF: 0-8; PF: 0-50; INV: 0-20; EV: 0-12.  3/15/18  3. Patient will increase FOTO > 16 pts to increase functional mobility. Eval:  76  Current:  77.  3/12/18  4. Patient will be FWB and ambulate without any assistive device and be able to ascend and descend steps to increase ease of entry into home. Eval:  Patient is NWB, using a rolling walker, she is unable to do steps except on her bottom.   Current:  Remains NWB       PLAN  [x]  Upgrade activities as tolerated     [x]  Continue plan of care  []  Update interventions per flow sheet       []  Discharge due to:_  []  Other:_      Pierce Perez PT 3/15/2018  1:45 PM    Future Appointments  Date Time Provider Naa Freitas   3/15/2018 3:00 PM Pierce Perez, PT [facility]

## 2018-04-12 NOTE — PROGRESS NOTES
[facility] at [address] [facility]  [address]  Phone: [phone]      Fax:  [phone]    Discharge Summary    Patient name: Sharon Varghese Start of Care: 18   Referral source: Gonzalo Paz PA-C : 1950   Medical/Treatment Diagnosis: Displaced trimalleolar fracture of right lower leg, initial encounter for closed fracture [S82.851A] Onset Date:18     Prior Hospitalization: see medical history Provider#: [id number]   Medications: Verified on Patient Summary List    Comorbidities: None. Prior Level of Function: Independent, no limitations. She was the  at [employer], worked 50-55 hours per week. Visits from Start of Care: 11    Missed Visits: 0    Reporting Period : 18 to 3/15/18      Goal: Patient's pain level will be 0-1/10 with activity in order to improve patient's ability to perform normal ADLs. Status at last note/certification:  5-8/10  Status at discharge: met    Goal: Patient will demonstrate PROM 0-5 degrees dorsiflexion; 0-55 degrees plantar flexion; 0-20 degrees inversion; 0-10 degrees eversion to increase ease of ADLs. Status at last note/certification: DF: 0-8; PF: 0-50; INV: 0-20; EV: 0-12. Status at discharge: met    Goal: Patient will increase FOTO > 16 pts to increase functional mobility. Status at last note/certification: 77. (initial score 48. +29)   Status at discharge: met    Goal: Patient will be FWB and ambulate without any assistive device and be able to ascend and descend steps to increase ease of entry into home. Status at last note/certification: Patient was remaining NWB with a rolling walker. Status at discharge: not met      Assessment/ Summary of Care:   Patient had made gains in ROM and function. She had much less pain but was still NWB per her doctor's orders. She returned to her doctor for reevaluation.   Current status unknown, she had met most of her goals.    RECOMMENDATIONS:  [x]Discontinue therapy: [x]Patient has reached or is progressing toward set goals      []Patient is non-compliant or has abdicated      []Due to lack of appreciable progress towards set goals    Naif Noland, PT 4/12/2018 2:22 PM